# Patient Record
Sex: FEMALE | Race: OTHER | HISPANIC OR LATINO | URBAN - METROPOLITAN AREA
[De-identification: names, ages, dates, MRNs, and addresses within clinical notes are randomized per-mention and may not be internally consistent; named-entity substitution may affect disease eponyms.]

---

## 2017-02-06 ENCOUNTER — EMERGENCY (EMERGENCY)
Facility: HOSPITAL | Age: 52
LOS: 1 days | Discharge: HOME | End: 2017-02-06
Attending: EMERGENCY MEDICINE
Payer: SELF-PAY

## 2017-02-06 VITALS
HEIGHT: 63 IN | DIASTOLIC BLOOD PRESSURE: 69 MMHG | TEMPERATURE: 98 F | RESPIRATION RATE: 20 BRPM | WEIGHT: 130.07 LBS | OXYGEN SATURATION: 100 % | HEART RATE: 69 BPM | SYSTOLIC BLOOD PRESSURE: 125 MMHG

## 2017-02-06 DIAGNOSIS — H61.23 IMPACTED CERUMEN, BILATERAL: ICD-10-CM

## 2017-02-06 PROCEDURE — 99283 EMERGENCY DEPT VISIT LOW MDM: CPT

## 2017-02-06 NOTE — ED PROVIDER NOTE - OBJECTIVE STATEMENT
50 y/o F with no significant PMHx presents to ED c/o decreased hearing in L ear x 5 days. Pt states lessened ability to hear out of L ear at first, and now she is nearly unable to hear out of her L ear. Denies any injury, ear pain, or any other complaints. NKDA.

## 2017-02-06 NOTE — ED ADULT NURSE NOTE - OBJECTIVE STATEMENT
Patient complains of hearing loss to left ear x last Thursday. Denies trauma, drainage, bleeding. Breathing easy and unlabored, speaking in full sentences.

## 2017-02-06 NOTE — ED ADULT TRIAGE NOTE - CHIEF COMPLAINT QUOTE
pt c/o can't hear from left ear started on "a little hearing lost, now can't hear anything," denies any injury to her ears

## 2017-02-06 NOTE — ED PROVIDER NOTE - MEDICAL DECISION MAKING DETAILS
52 y/o F presenting with decreased hearing, pt found to have cerumen impaction on exam. Will do procedure to remove impacted cerumen. 52 y/o F presenting with decreased hearing, pt found to have cerumen impaction on exam. Will do procedure to remove impacted cerumen. pt instructed to follow up with ENT in 1-2 days. Return to ED for worsening condition.

## 2017-02-06 NOTE — ED PROVIDER NOTE - NS ED MD SCRIBE ATTENDING SCRIBE SECTIONS
VITAL SIGNS( Pullset)/PAST MEDICAL/SURGICAL/SOCIAL HISTORY/HISTORY OF PRESENT ILLNESS/REVIEW OF SYSTEMS/PHYSICAL EXAM/DISPOSITION/HIV

## 2025-03-19 NOTE — ED PROVIDER NOTE - NEUROLOGICAL, MLM
Mya Duke, RN  Registered Nurse  Specialty: Registered Nurse     Telephone Encounter     Signed     Creation Time: 3/19/2025  1:36 PM     Signed       Received message that patient may be having a reaction to a medication.  Call placed to patient, left VM requesting return call to discuss.                 Alert and oriented, no focal deficits, no motor or sensory deficits.